# Patient Record
Sex: MALE | Race: WHITE | NOT HISPANIC OR LATINO | Employment: UNEMPLOYED | URBAN - METROPOLITAN AREA
[De-identification: names, ages, dates, MRNs, and addresses within clinical notes are randomized per-mention and may not be internally consistent; named-entity substitution may affect disease eponyms.]

---

## 2022-05-06 NOTE — TELEPHONE ENCOUNTER
Left msg for patient to call back to schedule for Monday  I can help with force on when he calls back

## 2022-05-06 NOTE — TELEPHONE ENCOUNTER
Patient went to office for appt with Dr Nelson Yuan and it was closed, anyway he can be seen before next available? Patient can be reached at [phone]

## 2022-05-09 ENCOUNTER — OFFICE VISIT (OUTPATIENT)
Dept: OBGYN CLINIC | Facility: CLINIC | Age: 22
End: 2022-05-09
Payer: COMMERCIAL

## 2022-05-09 VITALS — HEIGHT: 71 IN | SYSTOLIC BLOOD PRESSURE: 115 MMHG | DIASTOLIC BLOOD PRESSURE: 70 MMHG | HEART RATE: 76 BPM

## 2022-05-09 DIAGNOSIS — S76.111A QUADRICEPS STRAIN, RIGHT, INITIAL ENCOUNTER: Primary | ICD-10-CM

## 2022-05-09 PROCEDURE — 99203 OFFICE O/P NEW LOW 30 MIN: CPT | Performed by: ORTHOPAEDIC SURGERY

## 2022-05-09 NOTE — LETTER
May 9, 2022     Patient: Annabelle Lujan  YOB: 2000  Date of Visit: 5/9/2022      To Whom it May Concern:    Erum Mathew is under my professional care  Wendy Raymundo was seen in my office on 5/9/2022  Wendy Raymundo is to refrain from work related activity until cleared by physician  He will be re-evaluated in 4 weeks  If you have any questions or concerns, please don't hesitate to call           Sincerely,          Yeimi De León DO        CC: No Recipients

## 2022-05-09 NOTE — PROGRESS NOTES
Assessment/Plan:   Diagnoses and all orders for this visit:    Quadriceps strain, right, initial encounter  -     Ambulatory referral to Physical Therapy; Future    Discussed with patient that today's physical exam findings demonstrate extensor mechanism to intact without palpable void of the quadriceps tendon or muscle belly  These findings are most consistent with strain of the right quadriceps muscle  He will most likely benefit from formal physical therapy to work on pain reduction, stretching, and range of motion  A referral to formal PT has been provided  He has also been provided documentation to refrain from work related responsibilities as he works as a , and heavy lifting is contraindicated at this time  He can continue NSAIDs/Tylenol as needed for pain and soreness  He will discontinue use of the straight leg knee immobilizer and the crutch as soon as he can walk without a limp  He will be seen for follow-up in 4 weeks for re-evaluation  Patient is present understanding is in agreement with treatment plan  It appears the patient sustained a strain of his right quadriceps versus a partial tear  Physical examination shows extensor mechanism to be intact  No palpable void  No effusion  He has agreed to start therapy for range of motion, strengthening stretching  Return back in 1 month for re-evaluation  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    Subjective:   Patient ID: Lyssa Danial  2000     HPI  Patient is a 23 y o  male who presents for initial evaluation of right quadriceps injury sustained 5/1/2022  Patient states that he was walking across a bed of mul, when he lost his footing resulting in a attempt to regain his balance through flex of knee extension  He states that time the incident he felt a sharp stabbing pain in the anterior portion of his right thigh, and shortly thereafter noticed a large swelling    He was evaluated as local [facility] where he was diagnosed with a quadriceps strain, and placed in a straight leg knee immobilizer  He was also provided bilateral crutches and told to observing nonweightbearing restriction  He states that he has been consistent with use of the immobilizer, but admits that he has been bearing weight on the affected lower extremity  He denies any bruising, numbness, tingling, or mechanical symptoms  He states that his initial swelling has decreased significantly since initial onset  He has been taking ibuprofen as prescribed, and says that it is significantly effective at decreasing his pain  The following portions of the patient's history were reviewed and updated as appropriate:  Past medical history, past surgical history, Family history, social history, current medications and allergies    History reviewed  No pertinent past medical history  History reviewed  No pertinent surgical history  History reviewed  No pertinent family history  Social History     Socioeconomic History    Marital status: Single     Spouse name: None    Number of children: None    Years of education: None    Highest education level: None   Occupational History    None   Tobacco Use    Smoking status: Never Smoker    Smokeless tobacco: Never Used   Vaping Use    Vaping Use: Never used   Substance and Sexual Activity    Alcohol use: Yes     Comment: occas   Drug use: Never    Sexual activity: None   Other Topics Concern    None   Social History Narrative    None     Social Determinants of Health     Financial Resource Strain: Not on file   Food Insecurity: Not on file   Transportation Needs: Not on file   Physical Activity: Not on file   Stress: Not on file   Social Connections: Not on file   Intimate Partner Violence: Not on file   Housing Stability: Not on file       No current outpatient medications on file      No Known Allergies    Review of Systems   Constitutional: Negative for chills, fever and unexpected weight change  HENT: Negative for hearing loss, nosebleeds and sore throat  Eyes: Negative for pain, redness and visual disturbance  Respiratory: Negative for cough, shortness of breath and wheezing  Cardiovascular: Negative for chest pain, palpitations and leg swelling  Gastrointestinal: Negative for abdominal pain, nausea and vomiting  Endocrine: Negative for polydipsia and polyuria  Genitourinary: Negative for dysuria and hematuria  Musculoskeletal:        As noted in HPI   Skin: Negative for rash and wound  Neurological: Negative for dizziness, numbness and headaches  Psychiatric/Behavioral: Negative for decreased concentration and suicidal ideas  The patient is not nervous/anxious  Objective:  /70   Pulse 76   Ht 5' 11" (1 803 m)     Ortho Exam  Right lower extremity -   Patient presents with knee immobilizer in place using crutches as assistive device  Immobilizer was removed for examination without difficulty  Skin is warm and dry with no signs of erythema, ecchymosis, infection  Mild soft tissue swelling, no effusion present  Palpable spasm of rectus femoris, no palpable void of the quadriceps musculature  Knee extensor mechanism intact  4/5 MMT knee extension, 5/5 knee flexion  Knee is stable to varus, valgus, anterior, posterior stress  - Arielle's sign  Calf is soft and supple  2+ distal radial pulse with brisk capillary refill to the fingers  Radial, median, and ulnar motor and sensory distributions intact  Sensation light touch intact distally    Physical Exam  Vitals reviewed  Constitutional:       Appearance: He is well-developed  HENT:      Head: Normocephalic and atraumatic  Nose: Nose normal    Eyes:      Conjunctiva/sclera: Conjunctivae normal    Cardiovascular:      Rate and Rhythm: Normal rate  Pulmonary:      Effort: Pulmonary effort is normal    Musculoskeletal:      Cervical back: Neck supple     Skin:     General: Skin is warm and dry  Capillary Refill: Capillary refill takes less than 2 seconds  Neurological:      Mental Status: He is alert and oriented to person, place, and time     Psychiatric:         Mood and Affect: Mood normal          Behavior: Behavior normal         Diagnostic Test Review:  No imaging performed this visit    Procedures   No procedures performed this visit    Scribe Attestation    I,:  Ludwin Holguin am acting as a scribe while in the presence of the attending physician :       I,:  Casey Aguirre, DO personally performed the services described in this documentation    as scribed in my presence :

## 2022-06-10 ENCOUNTER — OFFICE VISIT (OUTPATIENT)
Dept: OBGYN CLINIC | Facility: CLINIC | Age: 22
End: 2022-06-10
Payer: COMMERCIAL

## 2022-06-10 VITALS — HEIGHT: 71 IN | BODY MASS INDEX: 28.14 KG/M2 | WEIGHT: 201 LBS

## 2022-06-10 DIAGNOSIS — S76.111D QUADRICEPS STRAIN, RIGHT, SUBSEQUENT ENCOUNTER: Primary | ICD-10-CM

## 2022-06-10 PROCEDURE — 99213 OFFICE O/P EST LOW 20 MIN: CPT | Performed by: ORTHOPAEDIC SURGERY

## 2022-06-10 NOTE — PROGRESS NOTES
Assessment:   Diagnosis ICD-10-CM Associated Orders   1  Quadriceps strain, right, subsequent encounter  S76 111D        Plan:    Findings consistent with resolved right quadriceps strain  He can return to normal activities to tolerance  If any future issues arise call for re evaluation  Work note supplied today, full duty  To do next visit:  Return if symptoms worsen or fail to improve  The above stated was discussed in layman's terms and the patient expressed understanding  All questions were answered to the patient's satisfaction  The patient's right quadriceps strain has completely resolved  He has full strength full motion  No pain  No weakness  No instability  Continue home exercise program   Continue stretching  Follow up on an as-needed basis  If his condition changes, he will not hesitate to let us know      Scribe Attestation    I,:  Leia Martinez am acting as a scribe while in the presence of the attending physician :       I,:  Gabriella Diallo MD personally performed the services described in this documentation    as scribed in my presence :             Subjective:   Karyle Olp is a 23 y o  male who presents for follow up regarding right quadriceps strain  Injury 5/1/22  Patient states he is having no pain today  He is back to walking, exercising w/o any pain in quadriceps  He found some exercises on you tube and has been performing  Denies any instability  Review of systems negative unless otherwise specified in HPI  Review of Systems   Constitutional: Negative for chills and fever  HENT: Negative for ear pain and sore throat  Eyes: Negative for pain and visual disturbance  Respiratory: Negative for cough and shortness of breath  Cardiovascular: Negative for chest pain and palpitations  Gastrointestinal: Negative for abdominal pain and vomiting  Genitourinary: Negative for dysuria and hematuria  Musculoskeletal: Negative for arthralgias and back pain  Skin: Negative for color change and rash  Neurological: Negative for seizures and syncope  All other systems reviewed and are negative  History reviewed  No pertinent past medical history  History reviewed  No pertinent surgical history  History reviewed  No pertinent family history  Social History     Occupational History    Not on file   Tobacco Use    Smoking status: Never Smoker    Smokeless tobacco: Never Used   Vaping Use    Vaping Use: Never used   Substance and Sexual Activity    Alcohol use: Yes     Comment: occas   Drug use: Never    Sexual activity: Not on file       No current outpatient medications on file  No Known Allergies       There were no vitals filed for this visit  Objective:                    Right Knee Exam     Muscle Strength   The patient has normal right knee strength  Tenderness   The patient is experiencing no tenderness  Range of Motion   Extension: normal   Flexion: normal     Tests   Maksim:  Medial - negative Lateral - negative  Varus: negative Valgus: negative  Lachman:  Anterior - negative      Drawer:  Anterior - negative        Other   Erythema: absent  Scars: absent  Sensation: normal  Pulse: present  Swelling: none  Effusion: no effusion present    Comments:  No pain against resisted knee extension, no palpable quad defect felt             Diagnostics, reviewed and taken today if performed as documented:    None performed          Procedures, if performed today:    Procedures    None performed      Portions of the record may have been created with voice recognition software  Occasional wrong word or "sound a like" substitutions may have occurred due to the inherent limitations of voice recognition software  Read the chart carefully and recognize, using context, where substitutions have occurred

## 2022-06-10 NOTE — LETTER
Peggy 10, 2022     Patient: Charley Tucker  YOB: 2000  Date of Visit: 6/10/2022      To Whom it May Concern:    Beto Givens is under my professional care  Jair Dusty was seen in my office on 6/10/2022  Jair Montano can return to normal work duties w/o restrictions 6/13/22  If you have any questions or concerns, please don't hesitate to call           Sincerely,          Alea Montalvo DO        CC: No Recipients

## 2024-06-10 ENCOUNTER — OFFICE VISIT (OUTPATIENT)
Dept: URGENT CARE | Facility: CLINIC | Age: 24
End: 2024-06-10

## 2024-06-10 VITALS
DIASTOLIC BLOOD PRESSURE: 94 MMHG | RESPIRATION RATE: 18 BRPM | TEMPERATURE: 98.6 F | HEART RATE: 70 BPM | SYSTOLIC BLOOD PRESSURE: 142 MMHG | OXYGEN SATURATION: 96 %

## 2024-06-10 DIAGNOSIS — K04.7 DENTAL ABSCESS: Primary | ICD-10-CM

## 2024-06-10 PROCEDURE — G0382 LEV 3 HOSP TYPE B ED VISIT: HCPCS

## 2024-06-10 RX ORDER — AMOXICILLIN AND CLAVULANATE POTASSIUM 875; 125 MG/1; MG/1
1 TABLET, FILM COATED ORAL EVERY 12 HOURS SCHEDULED
Qty: 14 TABLET | Refills: 0 | Status: SHIPPED | OUTPATIENT
Start: 2024-06-10 | End: 2024-06-17

## 2024-06-10 NOTE — LETTER
Peggy 10, 2024     Patient: Juma Duenas   YOB: 2000   Date of Visit: 6/10/2024       To Whom it May Concern:    Juma Duenas was seen in my clinic on 6/10/2024. He may return to work on 6/11/2024 .    If you have any questions or concerns, please don't hesitate to call.         Sincerely,          LONG Magaña        CC: No Recipients

## 2024-06-10 NOTE — PATIENT INSTRUCTIONS
Take antibiotics. Complete entire course of antibiotics even if feeling better and take medication with food to avoid upset stomach. May take tylenol/ibuprofen every 4-6 hours as needed for pain. Follow-up with dentist within 72 hours to prevent recurring infection. Report to ER if symptoms worsen.

## 2024-06-10 NOTE — PROGRESS NOTES
[facility]        NAME: Juma Duenas is a 24 y.o. male  : 2000    MRN: [id number]  DATE: Peggy 10, 2024  TIME: 7:10 PM    Assessment and Plan   Dental abscess [K04.7]  1. Dental abscess  amoxicillin-clavulanate (AUGMENTIN) 875-125 mg per tablet        PE consistent with dental abscess - antibiotics as directed. Patient declined pain medications, prefers all natural products. Offered to provide information on dental clinic but patient relates he has a dentist he'll follow-up with. VSS in clinic, appears in no acute distress. Educated on use of OTC products for symptoms. Advised close follow-up with dentist or to report to the ER if symptoms worsen. Patient verbalizes understanding and agreeable to plan. Work note provided.    Patient Instructions     Take antibiotics. Complete entire course of antibiotics even if feeling better and take medication with food to avoid upset stomach. May take tylenol/ibuprofen every 4-6 hours as needed for pain. Follow-up with dentist within 72 hours to prevent recurring infection. Report to ER if symptoms worsen.      Chief Complaint     Chief Complaint   Patient presents with    Dental Pain     Dental pain to Left sided lower dental pain. Started yesterday. Using clove oil.          History of Present Illness       24 year old male presents for evaluation of dental pain that started yesterday. He reports a recurrent history of dental pain secondary to dental infections but relates he's been unable to establish care with a dentist due to insurance issues. He denies any known fevers, SOB, or difficulty swallowing. He reports mild facial pain and sinus pressure, located on the left side of his face. He has tried clove oil for symptoms with some relief.    Dental Pain   This is a recurrent problem. The current episode started yesterday. The problem occurs constantly. The problem has been unchanged. The pain is at a severity of 5/10. The pain is moderate. Associated  symptoms include facial pain and sinus pressure. Pertinent negatives include no difficulty swallowing, fever, oral bleeding or thermal sensitivity. Treatments tried: Clove oil. The treatment provided mild relief.       Review of Systems   Review of Systems   Constitutional:  Negative for activity change, appetite change, chills, fatigue and fever.   HENT:  Positive for dental problem, facial swelling and sinus pressure. Negative for congestion, ear pain, rhinorrhea, sore throat and trouble swallowing.    Eyes:  Negative for visual disturbance.   Respiratory:  Negative for cough, chest tightness and shortness of breath.    Cardiovascular:  Negative for chest pain and palpitations.   Gastrointestinal:  Negative for abdominal pain, constipation, diarrhea and vomiting.   Musculoskeletal:  Negative for arthralgias, back pain, myalgias and neck pain.   Skin:  Negative for color change and pallor.   Allergic/Immunologic: Negative for environmental allergies and food allergies.   Neurological:  Negative for dizziness, light-headedness and headaches.         Current Medications       Current Outpatient Medications:     amoxicillin-clavulanate (AUGMENTIN) 875-125 mg per tablet, Take 1 tablet by mouth every 12 (twelve) hours for 7 days, Disp: 14 tablet, Rfl: 0    Current Allergies     Allergies as of 06/10/2024    (No Known Allergies)            The following portions of the patient's history were reviewed and updated as appropriate: allergies, current medications, past family history, past medical history, past social history, past surgical history and problem list.     History reviewed. No pertinent past medical history.    History reviewed. No pertinent surgical history.    History reviewed. No pertinent family history.      Medications have been verified.        Objective   /94   Pulse 70   Temp 98.6 °F (37 °C)   Resp 18   SpO2 96%        Physical Exam     Physical Exam  Vitals and nursing note reviewed.    Constitutional:       General: He is awake. He is not in acute distress.     Appearance: Normal appearance. He is well-developed and normal weight.   HENT:      Head: Normocephalic and atraumatic.      Right Ear: Hearing, tympanic membrane, ear canal and external ear normal.      Left Ear: Hearing, tympanic membrane, ear canal and external ear normal.      Nose: No congestion or rhinorrhea.      Right Sinus: No maxillary sinus tenderness or frontal sinus tenderness.      Left Sinus: Maxillary sinus tenderness present. No frontal sinus tenderness.      Mouth/Throat:      Lips: Pink.      Mouth: Mucous membranes are moist.      Dentition: Abnormal dentition. Dental abscesses present. No gingival swelling or gum lesions.      Pharynx: Oropharynx is clear. Uvula midline. No posterior oropharyngeal erythema.     Eyes:      Conjunctiva/sclera: Conjunctivae normal.   Cardiovascular:      Rate and Rhythm: Normal rate and regular rhythm.      Pulses: Normal pulses.      Heart sounds: Normal heart sounds.   Pulmonary:      Effort: Pulmonary effort is normal.      Breath sounds: Normal breath sounds.   Musculoskeletal:      Cervical back: Full passive range of motion without pain, normal range of motion and neck supple.   Lymphadenopathy:      Cervical: No cervical adenopathy.   Skin:     General: Skin is warm and dry.   Neurological:      General: No focal deficit present.      Mental Status: He is alert and oriented to person, place, and time.   Psychiatric:         Mood and Affect: Mood normal.         Behavior: Behavior normal. Behavior is cooperative.         Thought Content: Thought content normal.         Judgment: Judgment normal.